# Patient Record
Sex: FEMALE | URBAN - METROPOLITAN AREA
[De-identification: names, ages, dates, MRNs, and addresses within clinical notes are randomized per-mention and may not be internally consistent; named-entity substitution may affect disease eponyms.]

---

## 2024-06-21 DIAGNOSIS — F32.A DEPRESSION, UNSPECIFIED: ICD-10-CM

## 2024-06-21 RX ORDER — TRAZODONE HYDROCHLORIDE 50 MG/1
50 TABLET ORAL NIGHTLY PRN
Qty: 90 TABLET | Refills: 3 | Status: SHIPPED | OUTPATIENT
Start: 2024-06-21

## 2024-06-21 RX ORDER — SERTRALINE HYDROCHLORIDE 100 MG/1
150 TABLET, FILM COATED ORAL DAILY
Qty: 135 TABLET | Refills: 3 | Status: SHIPPED | OUTPATIENT
Start: 2024-06-21

## 2024-07-26 DIAGNOSIS — K21.9 GASTRO-ESOPHAGEAL REFLUX DISEASE WITHOUT ESOPHAGITIS: ICD-10-CM

## 2024-07-26 RX ORDER — OMEPRAZOLE 40 MG/1
40 CAPSULE, DELAYED RELEASE ORAL DAILY
Qty: 90 CAPSULE | Refills: 0 | Status: SHIPPED | OUTPATIENT
Start: 2024-07-26

## 2024-10-24 ENCOUNTER — APPOINTMENT (OUTPATIENT)
Dept: PRIMARY CARE | Facility: CLINIC | Age: 34
End: 2024-10-24
Payer: COMMERCIAL

## 2024-10-24 VITALS
TEMPERATURE: 97.3 F | HEART RATE: 105 BPM | SYSTOLIC BLOOD PRESSURE: 109 MMHG | HEIGHT: 66 IN | DIASTOLIC BLOOD PRESSURE: 74 MMHG | WEIGHT: 133.7 LBS | BODY MASS INDEX: 21.49 KG/M2 | OXYGEN SATURATION: 97 %

## 2024-10-24 DIAGNOSIS — N64.4 BREAST TENDERNESS: Primary | ICD-10-CM

## 2024-10-24 DIAGNOSIS — K21.9 GASTRO-ESOPHAGEAL REFLUX DISEASE WITHOUT ESOPHAGITIS: ICD-10-CM

## 2024-10-24 PROCEDURE — 3008F BODY MASS INDEX DOCD: CPT | Performed by: INTERNAL MEDICINE

## 2024-10-24 PROCEDURE — 99213 OFFICE O/P EST LOW 20 MIN: CPT | Performed by: INTERNAL MEDICINE

## 2024-10-24 RX ORDER — OMEPRAZOLE 20 MG/1
20 CAPSULE, DELAYED RELEASE ORAL DAILY
Qty: 90 CAPSULE | Refills: 3 | Status: SHIPPED | OUTPATIENT
Start: 2024-10-24 | End: 2025-10-24

## 2024-10-24 ASSESSMENT — PATIENT HEALTH QUESTIONNAIRE - PHQ9
2. FEELING DOWN, DEPRESSED OR HOPELESS: NOT AT ALL
SUM OF ALL RESPONSES TO PHQ9 QUESTIONS 1 AND 2: 0
1. LITTLE INTEREST OR PLEASURE IN DOING THINGS: NOT AT ALL

## 2024-10-24 ASSESSMENT — ENCOUNTER SYMPTOMS
FEVER: 0
CHILLS: 0

## 2024-10-24 NOTE — PROGRESS NOTES
"CHIEF COMPLAINT  breast sorness (Left breast )    HISTORY OF PRESENT ILLNESS  Jerrica Miner is a 34 y.o. female presents today for follow up of breast sorness (Left breast )    HPI    Left breast soreness started about 2 months ago - was doing a lot of yard work when it started.  Comes and goes.   Achy, not sharp.   Not triggered by movement.  No rashes.  Has not felt any abnormalities.     Also needs refill on omeprazole at reduced dose 20 mg daily.     REVIEW OF SYSTEMS  Review of Systems   Constitutional:  Negative for chills and fever.   Musculoskeletal:         Left breast / chest wall discomfort        ALLERGIES  Patient has no known allergies.    MEDICATIONS  Current Outpatient Medications   Medication Instructions    omeprazole (PRILOSEC) 20 mg, oral, Daily    sertraline (ZOLOFT) 150 mg, oral, Daily    traZODone (DESYREL) 50 mg, oral, Nightly PRN       TOBACCO USE  Social History     Tobacco Use   Smoking Status Every Day    Types: Cigarettes   Smokeless Tobacco Never       DEPRESSION SCREEN  Over the past 2 weeks, how often have you been bothered by any of the following problems?  Little interest or pleasure in doing things: Not at all  Feeling down, depressed, or hopeless: Not at all    SURGICAL HISTORY  Past Surgical History:  08/02/2022: OTHER SURGICAL HISTORY      Comment:  Esophagogastroduodenoscopy  01/03/2019: OTHER SURGICAL HISTORY      Comment:  Esophagogastroduodenoscopy       OBJECTIVE    /74   Pulse 105   Temp 36.3 °C (97.3 °F)   Ht 1.676 m (5' 6\")   Wt 60.6 kg (133 lb 11.2 oz)   SpO2 97%   BMI 21.58 kg/m²    BMI: Estimated body mass index is 21.58 kg/m² as calculated from the following:    Height as of this encounter: 1.676 m (5' 6\").    Weight as of this encounter: 60.6 kg (133 lb 11.2 oz).    BP Readings from Last 3 Encounters:   10/24/24 109/74   06/01/23 99/70   04/26/22 126/69      Wt Readings from Last 3 Encounters:   10/24/24 60.6 kg (133 lb 11.2 oz)   06/01/23 60.6 kg (133 lb " 9 oz)   04/26/22 58.7 kg (129 lb 5 oz)        PHYSICAL EXAM  Physical Exam  Constitutional:       Appearance: Normal appearance.   Cardiovascular:      Rate and Rhythm: Normal rate and regular rhythm.      Heart sounds: No murmur heard.  Pulmonary:      Effort: No respiratory distress.      Breath sounds: Normal breath sounds. No wheezing.   Musculoskeletal:      Comments: Mild tenderness of left lateral pectoral muscle near axilla.  No axillary lymphadenopathy appreciated.    Neurological:      General: No focal deficit present.      Mental Status: She is alert and oriented to person, place, and time.      Gait: Gait normal.   Psychiatric:         Mood and Affect: Mood normal.         Behavior: Behavior normal.         Thought Content: Thought content normal.         Judgment: Judgment normal.          ASSESSMENT AND PLAN  Assessment/Plan   Problem List Items Addressed This Visit       Breast tenderness - Primary    Relevant Orders    BI mammo bilateral diagnostic tomosynthesis    BI US breast complete left    Gastro-esophageal reflux disease without esophagitis    Relevant Medications    omeprazole (PriLOSEC) 20 mg DR capsule     Left lateral breast tenderness - possibly pectoral, however will eval for breast issue.  Mammogram and US ordered.    GERD - omeprazole sent in at lower dose.      Follow-up pending results.

## 2024-12-18 ENCOUNTER — HOSPITAL ENCOUNTER (OUTPATIENT)
Dept: RADIOLOGY | Facility: CLINIC | Age: 34
Discharge: HOME | End: 2024-12-18
Payer: COMMERCIAL

## 2024-12-18 VITALS — WEIGHT: 133.6 LBS | BODY MASS INDEX: 21.47 KG/M2 | HEIGHT: 66 IN

## 2024-12-18 DIAGNOSIS — N64.4 BREAST TENDERNESS: ICD-10-CM

## 2024-12-18 PROCEDURE — 77066 DX MAMMO INCL CAD BI: CPT

## 2024-12-18 PROCEDURE — 77066 DX MAMMO INCL CAD BI: CPT | Mod: LEFT SIDE | Performed by: RADIOLOGY

## 2024-12-18 PROCEDURE — 77062 BREAST TOMOSYNTHESIS BI: CPT | Mod: LEFT SIDE | Performed by: RADIOLOGY

## 2024-12-18 PROCEDURE — 76642 ULTRASOUND BREAST LIMITED: CPT | Mod: LT

## 2024-12-18 PROCEDURE — 76642 ULTRASOUND BREAST LIMITED: CPT | Mod: LEFT SIDE | Performed by: RADIOLOGY

## 2024-12-20 ENCOUNTER — TELEPHONE (OUTPATIENT)
Dept: PRIMARY CARE | Facility: CLINIC | Age: 34
End: 2024-12-20
Payer: COMMERCIAL

## 2024-12-20 DIAGNOSIS — K21.9 GASTRO-ESOPHAGEAL REFLUX DISEASE WITHOUT ESOPHAGITIS: ICD-10-CM

## 2024-12-20 DIAGNOSIS — F17.210 TOBACCO DEPENDENCE DUE TO CIGARETTES: Primary | ICD-10-CM

## 2024-12-20 DIAGNOSIS — F32.A DEPRESSION, UNSPECIFIED: ICD-10-CM

## 2024-12-20 RX ORDER — VARENICLINE TARTRATE 1 MG/1
1 TABLET, FILM COATED ORAL 2 TIMES DAILY
Qty: 60 TABLET | Refills: 2 | Status: SHIPPED | OUTPATIENT
Start: 2024-12-20 | End: 2025-03-20

## 2024-12-20 RX ORDER — TRAZODONE HYDROCHLORIDE 50 MG/1
50 TABLET ORAL NIGHTLY PRN
Qty: 90 TABLET | Refills: 3 | Status: SHIPPED | OUTPATIENT
Start: 2024-12-20

## 2024-12-20 RX ORDER — SERTRALINE HYDROCHLORIDE 100 MG/1
150 TABLET, FILM COATED ORAL DAILY
Qty: 135 TABLET | Refills: 3 | Status: SHIPPED | OUTPATIENT
Start: 2024-12-20

## 2024-12-20 RX ORDER — OMEPRAZOLE 20 MG/1
20 CAPSULE, DELAYED RELEASE ORAL DAILY
Qty: 90 CAPSULE | Refills: 3 | Status: SHIPPED | OUTPATIENT
Start: 2024-12-20 | End: 2025-12-20

## 2024-12-20 NOTE — TELEPHONE ENCOUNTER
Pt LM asking for   omeprazole (PriLOSEC) 20 mg DR capsule , traZODone (Desyrel) 50 mg tablet and sertraline (Zoloft) 100 mg tablet  to be sent to a new pharmacy. The [facility] in [place] closed and they were unable to transfer them       [id number] - [place], [place] - [address] [address]  Phone: [phone] Fax: [phone]